# Patient Record
Sex: FEMALE | Race: BLACK OR AFRICAN AMERICAN | NOT HISPANIC OR LATINO | URBAN - METROPOLITAN AREA
[De-identification: names, ages, dates, MRNs, and addresses within clinical notes are randomized per-mention and may not be internally consistent; named-entity substitution may affect disease eponyms.]

---

## 2020-07-25 ENCOUNTER — TELEPHONE ENCOUNTER (OUTPATIENT)
Dept: URBAN - METROPOLITAN AREA CLINIC 13 | Facility: CLINIC | Age: 80
End: 2020-07-25

## 2020-07-25 RX ORDER — POLYETHYLENE GLYCOL 3350, SODIUM CHLORIDE, SODIUM BICARBONATE AND POTASSIUM CHLORIDE WITH LEMON FLAVOR 420; 11.2; 5.72; 1.48 G/4L; G/4L; G/4L; G/4L
TAKE  ML AS DIRECTED MIX CONTENTS PER DIRECTIONS, BEGIN DRINKING 1/2 SOLUTION @ 5P, COMPLETE REMAINDER OF SOLUTION 6HR PRIOR TO PROCEDURE POWDER, FOR SOLUTION ORAL
Qty: 1 | Refills: 0 | OUTPATIENT
Start: 2017-10-23 | End: 2017-11-13

## 2020-07-25 RX ORDER — ESOMEPRAZOLE MAGNESIUM 40 MG
TAKE 1 CAPSULE DAILY AS NEEDED CAPSULE,DELAYED RELEASE (ENTERIC COATED) ORAL
Refills: 0 | OUTPATIENT
Start: 2011-12-31 | End: 2014-11-18

## 2020-07-25 RX ORDER — ROSUVASTATIN CALCIUM 20 MG
TAKE 1 TABLET DAILY TABLET ORAL
Refills: 0 | OUTPATIENT
Start: 2011-11-10 | End: 2012-06-12

## 2020-07-25 RX ORDER — PRAVASTATIN SODIUM 80 MG/1
TAKE 1 TABLET DAILY TABLET ORAL
Refills: 0 | OUTPATIENT
End: 2017-11-13

## 2020-07-25 RX ORDER — POLYETHYLENE GLYCOL 3350, SODIUM CHLORIDE, SODIUM BICARBONATE AND POTASSIUM CHLORIDE WITH LEMON FLAVOR 420; 11.2; 5.72; 1.48 G/4L; G/4L; G/4L; G/4L
TAKE AS DIRECTED POWDER, FOR SOLUTION ORAL
Qty: 1 | Refills: 0 | OUTPATIENT
Start: 2012-05-21 | End: 2012-06-12

## 2020-07-26 ENCOUNTER — TELEPHONE ENCOUNTER (OUTPATIENT)
Dept: URBAN - METROPOLITAN AREA CLINIC 13 | Facility: CLINIC | Age: 80
End: 2020-07-26

## 2020-07-26 RX ORDER — IBUPROFEN 600 MG/1
TABLET ORAL
Qty: 24 | Refills: 0 | Status: ACTIVE | COMMUNITY
Start: 2011-07-22

## 2020-07-26 RX ORDER — ALLOPURINOL 300 MG/1
TAKE 1 TABLET DAILY AS DIRECTED TABLET ORAL
Qty: 30 | Refills: 0 | Status: ACTIVE | COMMUNITY
Start: 2012-05-07

## 2020-07-26 RX ORDER — METHYLPREDNISOLONE 4 MG/1
TABLET ORAL
Qty: 21 | Refills: 0 | Status: ACTIVE | COMMUNITY
Start: 2020-01-24

## 2020-07-26 RX ORDER — SUCRALFATE 1 G/1
TAKE 1 TABLET 4 TIMES DAILY, BEFORE MEALS AND AT BEDTIME TABLET ORAL
Qty: 120 | Refills: 2 | Status: ACTIVE | COMMUNITY
Start: 2012-01-18

## 2020-07-26 RX ORDER — PRAVASTATIN SODIUM 80 MG/1
TAKE 1 TABLET 3 TIMES A WEEK. UNKNOWN DOSAGE PER PATIENT TABLET ORAL
Refills: 0 | Status: ACTIVE | COMMUNITY

## 2020-07-26 RX ORDER — PRAVASTATIN SODIUM 40 MG/1
TABLET ORAL
Qty: 90 | Refills: 0 | Status: ACTIVE | COMMUNITY
Start: 2013-12-13

## 2020-07-26 RX ORDER — NITROFURANTOIN 100 MG/1
CAPSULE ORAL
Qty: 10 | Refills: 0 | Status: ACTIVE | COMMUNITY
Start: 2011-07-22

## 2020-07-26 RX ORDER — DICLOFENAC SODIUM 1 %
GEL (GRAM) TOPICAL
Qty: 100 | Refills: 0 | Status: ACTIVE | COMMUNITY
Start: 2014-02-09

## 2020-07-26 RX ORDER — OXYCODONE AND ACETAMINOPHEN 5; 325 MG/1; MG/1
TABLET ORAL
Qty: 60 | Refills: 0 | Status: ACTIVE | COMMUNITY
Start: 2012-01-02

## 2020-07-26 RX ORDER — ROSUVASTATIN CALCIUM 20 MG
TABLET ORAL
Qty: 30 | Refills: 0 | Status: ACTIVE | COMMUNITY
Start: 2011-11-10

## 2020-07-26 RX ORDER — LUBIPROSTONE 8 UG/1
CAPSULE, GELATIN COATED ORAL
Qty: 30 | Refills: 0 | Status: ACTIVE | COMMUNITY
Start: 2011-03-04

## 2020-07-26 RX ORDER — PREDNISONE 10 MG/1
TABLET ORAL
Qty: 48 | Refills: 0 | Status: ACTIVE | COMMUNITY
Start: 2019-01-18

## 2020-07-26 RX ORDER — DEXLANSOPRAZOLE 60 MG/1
TAKE 1 CAPSULE DAILY EVERY MORNING BEFORE BREAKFAST CAPSULE, DELAYED RELEASE ORAL
Qty: 90 | Refills: 3 | Status: ACTIVE | COMMUNITY

## 2020-07-26 RX ORDER — IRBESARTAN/HYDROCHLOROTHIAZIDE 150-12.5MG
TAKE 1 TABLET ONCE DAILY TABLET ORAL
Refills: 0 | Status: ACTIVE | COMMUNITY
Start: 2012-02-20

## 2020-07-26 RX ORDER — DEXLANSOPRAZOLE 60 MG/1
CAPSULE, DELAYED RELEASE ORAL
Qty: 90 | Refills: 0 | Status: ACTIVE | COMMUNITY
Start: 2012-02-02

## 2020-07-26 RX ORDER — COLCHICINE 0.6 MG/1
TAKE 1 TABLET BY MOUTH TWICE A DAY TABLET, FILM COATED ORAL
Qty: 7 | Refills: 0 | Status: ACTIVE | COMMUNITY
Start: 2012-04-20

## 2020-07-26 RX ORDER — PREDNISONE 10 MG/1
TABLET ORAL
Qty: 10 | Refills: 0 | Status: ACTIVE | COMMUNITY
Start: 2019-09-20

## 2020-07-26 RX ORDER — SUCRALFATE 1 G/1
TABLET ORAL
Qty: 90 | Refills: 0 | Status: ACTIVE | COMMUNITY
Start: 2011-12-08

## 2020-07-26 RX ORDER — DEXLANSOPRAZOLE 60 MG/1
CAPSULE, DELAYED RELEASE ORAL
Qty: 30 | Refills: 0 | Status: ACTIVE | COMMUNITY
Start: 2011-11-30

## 2020-07-26 RX ORDER — SIMVASTATIN 20 MG/1
TABLET, FILM COATED ORAL
Qty: 90 | Refills: 0 | Status: ACTIVE | COMMUNITY
Start: 2011-08-12

## 2020-07-26 RX ORDER — ESOMEPRAZOLE MAGNESIUM 40 MG
CAPSULE,DELAYED RELEASE (ENTERIC COATED) ORAL
Qty: 30 | Refills: 0 | Status: ACTIVE | COMMUNITY
Start: 2011-12-31

## 2020-07-26 RX ORDER — TRAMADOL HYDROCHLORIDE 50 MG/1
TABLET ORAL
Qty: 30 | Refills: 0 | Status: ACTIVE | COMMUNITY
Start: 2019-03-11

## 2020-07-26 RX ORDER — TRAMADOL HYDROCHLORIDE 50 MG/1
TABLET ORAL
Qty: 30 | Refills: 0 | Status: ACTIVE | COMMUNITY
Start: 2014-02-18

## 2020-07-26 RX ORDER — HYDROCODONE BITARTRATE AND ACETAMINOPHEN 5; 325 MG/1; MG/1
TABLET ORAL
Qty: 90 | Refills: 0 | Status: ACTIVE | COMMUNITY
Start: 2011-11-30

## 2020-07-26 RX ORDER — LATANOPROST/PF 0.005 %
DROPS OPHTHALMIC (EYE)
Qty: 2 | Refills: 0 | Status: ACTIVE | COMMUNITY
Start: 2011-08-27

## 2020-07-26 RX ORDER — DICLOFENAC SODIUM 10 MG/G
GEL TOPICAL
Qty: 300 | Refills: 0 | Status: ACTIVE | COMMUNITY
Start: 2019-05-21

## 2020-07-26 RX ORDER — DICLOFENAC SODIUM 10 MG/G
APPLY SPARINGLY TO AFFECTED AREA(S) ONCE DAILY GEL TOPICAL
Refills: 0 | Status: ACTIVE | COMMUNITY
Start: 2020-02-23

## 2020-07-26 RX ORDER — IRBESARTAN/HYDROCHLOROTHIAZIDE 150-12.5MG
TABLET ORAL
Qty: 90 | Refills: 0 | Status: ACTIVE | COMMUNITY
Start: 2011-11-10

## 2020-07-26 RX ORDER — METHYLPREDNISOLONE 4 MG/1
TABLET ORAL
Qty: 30 | Refills: 0 | Status: ACTIVE | COMMUNITY
Start: 2019-03-11

## 2020-12-17 ENCOUNTER — WEB ENCOUNTER (OUTPATIENT)
Dept: URBAN - METROPOLITAN AREA CLINIC 113 | Facility: CLINIC | Age: 80
End: 2020-12-17

## 2020-12-17 ENCOUNTER — LAB OUTSIDE AN ENCOUNTER (OUTPATIENT)
Dept: URBAN - METROPOLITAN AREA CLINIC 113 | Facility: CLINIC | Age: 80
End: 2020-12-17

## 2020-12-17 ENCOUNTER — OFFICE VISIT (OUTPATIENT)
Dept: URBAN - METROPOLITAN AREA CLINIC 113 | Facility: CLINIC | Age: 80
End: 2020-12-17
Payer: MEDICARE

## 2020-12-17 VITALS
HEIGHT: 63 IN | TEMPERATURE: 97.8 F | DIASTOLIC BLOOD PRESSURE: 70 MMHG | WEIGHT: 155 LBS | HEART RATE: 80 BPM | BODY MASS INDEX: 27.46 KG/M2 | SYSTOLIC BLOOD PRESSURE: 124 MMHG

## 2020-12-17 DIAGNOSIS — K21.9 GERD WITHOUT ESOPHAGITIS: ICD-10-CM

## 2020-12-17 DIAGNOSIS — R63.4 WEIGHT LOSS: ICD-10-CM

## 2020-12-17 PROCEDURE — G8427 DOCREV CUR MEDS BY ELIG CLIN: HCPCS | Performed by: INTERNAL MEDICINE

## 2020-12-17 PROCEDURE — G8482 FLU IMMUNIZE ORDER/ADMIN: HCPCS | Performed by: INTERNAL MEDICINE

## 2020-12-17 PROCEDURE — G9903 PT SCRN TBCO ID AS NON USER: HCPCS | Performed by: INTERNAL MEDICINE

## 2020-12-17 PROCEDURE — 99213 OFFICE O/P EST LOW 20 MIN: CPT | Performed by: INTERNAL MEDICINE

## 2020-12-17 RX ORDER — IBUPROFEN 600 MG/1
TABLET ORAL
Qty: 24 | Refills: 0 | Status: ON HOLD | COMMUNITY
Start: 2011-07-22

## 2020-12-17 RX ORDER — PRAVASTATIN SODIUM 40 MG/1
TABLET ORAL
Qty: 90 | Refills: 0 | Status: ON HOLD | COMMUNITY
Start: 2013-12-13

## 2020-12-17 RX ORDER — COLCHICINE 0.6 MG/1
TAKE 1 TABLET BY MOUTH TWICE A DAY TABLET, FILM COATED ORAL
Qty: 7 | Refills: 0 | Status: ON HOLD | COMMUNITY
Start: 2012-04-20

## 2020-12-17 RX ORDER — OXYCODONE AND ACETAMINOPHEN 5; 325 MG/1; MG/1
TABLET ORAL
Qty: 60 | Refills: 0 | Status: ON HOLD | COMMUNITY
Start: 2012-01-02

## 2020-12-17 RX ORDER — PREDNISONE 10 MG/1
TABLET ORAL
Qty: 48 | Refills: 0 | Status: ON HOLD | COMMUNITY
Start: 2019-01-18

## 2020-12-17 RX ORDER — HYDROCODONE BITARTRATE AND ACETAMINOPHEN 5; 325 MG/1; MG/1
TABLET ORAL
Qty: 90 | Refills: 0 | Status: ON HOLD | COMMUNITY
Start: 2011-11-30

## 2020-12-17 RX ORDER — ESOMEPRAZOLE MAGNESIUM 40 MG
CAPSULE,DELAYED RELEASE (ENTERIC COATED) ORAL
Qty: 30 | Refills: 0 | Status: ON HOLD | COMMUNITY
Start: 2011-12-31

## 2020-12-17 RX ORDER — METHYLPREDNISOLONE 4 MG/1
TABLET ORAL
Qty: 21 | Refills: 0 | Status: ON HOLD | COMMUNITY
Start: 2020-01-24

## 2020-12-17 RX ORDER — SUCRALFATE 1 G/1
TABLET ORAL
Qty: 90 | Refills: 0 | Status: ON HOLD | COMMUNITY
Start: 2011-12-08

## 2020-12-17 RX ORDER — SIMVASTATIN 20 MG/1
TABLET, FILM COATED ORAL
Qty: 90 | Refills: 0 | Status: ON HOLD | COMMUNITY
Start: 2011-08-12

## 2020-12-17 RX ORDER — ALLOPURINOL 300 MG/1
TAKE 1 TABLET DAILY AS DIRECTED TABLET ORAL
Qty: 30 | Refills: 0 | Status: ACTIVE | COMMUNITY
Start: 2012-05-07

## 2020-12-17 RX ORDER — ASPIRIN 81 MG/1
1 TABLET TABLET ORAL ONCE A DAY
Status: ACTIVE | COMMUNITY

## 2020-12-17 RX ORDER — DICLOFENAC SODIUM 10 MG/G
GEL TOPICAL
Qty: 300 | Refills: 0 | Status: ON HOLD | COMMUNITY
Start: 2019-05-21

## 2020-12-17 RX ORDER — METHYLPREDNISOLONE 4 MG/1
TABLET ORAL
Qty: 30 | Refills: 0 | Status: ON HOLD | COMMUNITY
Start: 2019-03-11

## 2020-12-17 RX ORDER — DICLOFENAC SODIUM 1 %
GEL (GRAM) TOPICAL
Qty: 100 | Refills: 0 | Status: ON HOLD | COMMUNITY
Start: 2014-02-09

## 2020-12-17 RX ORDER — PRAVASTATIN SODIUM 80 MG/1
TAKE 1 TABLET 3 TIMES A WEEK. UNKNOWN DOSAGE PER PATIENT TABLET ORAL
Refills: 0 | Status: ON HOLD | COMMUNITY

## 2020-12-17 RX ORDER — LUBIPROSTONE 8 UG/1
CAPSULE, GELATIN COATED ORAL
Qty: 30 | Refills: 0 | Status: ON HOLD | COMMUNITY
Start: 2011-03-04

## 2020-12-17 RX ORDER — ROSUVASTATIN CALCIUM 20 MG
TABLET ORAL
Qty: 30 | Refills: 0 | Status: ON HOLD | COMMUNITY
Start: 2011-11-10

## 2020-12-17 RX ORDER — PREDNISONE 10 MG/1
TABLET ORAL
Qty: 10 | Refills: 0 | Status: ON HOLD | COMMUNITY
Start: 2019-09-20

## 2020-12-17 RX ORDER — AZELASTINE HYDROCHLORIDE 0.5 MG/ML
1 DROP INTO AFFECTED EYE SOLUTION/ DROPS INTRAOCULAR ONCE A DAY
Status: ACTIVE | COMMUNITY

## 2020-12-17 RX ORDER — NITROFURANTOIN 100 MG/1
CAPSULE ORAL
Qty: 10 | Refills: 0 | Status: ON HOLD | COMMUNITY
Start: 2011-07-22

## 2020-12-17 RX ORDER — LATANOPROST/PF 0.005 %
DROPS OPHTHALMIC (EYE)
Qty: 2 | Refills: 0 | Status: ON HOLD | COMMUNITY
Start: 2011-08-27

## 2020-12-17 RX ORDER — IRBESARTAN/HYDROCHLOROTHIAZIDE 150-12.5MG
TABLET ORAL
Qty: 90 | Refills: 0 | Status: ON HOLD | COMMUNITY
Start: 2011-11-10

## 2020-12-17 RX ORDER — TRAMADOL HYDROCHLORIDE 50 MG/1
TABLET ORAL
Qty: 30 | Refills: 0 | Status: ON HOLD | COMMUNITY
Start: 2014-02-18

## 2020-12-17 RX ORDER — DEXLANSOPRAZOLE 60 MG/1
TAKE 1 CAPSULE DAILY EVERY MORNING BEFORE BREAKFAST CAPSULE, DELAYED RELEASE ORAL
Qty: 90 | Refills: 3 | Status: ACTIVE | COMMUNITY

## 2020-12-17 NOTE — HPI-TODAY'S VISIT:
80-year-old with a history of gastroesophageal reflux disease, functional dyspepsia, chronic constipation.  She is being referred by Dr. Alford for weight loss.  Blood work on September 25, 2020 revealed a sodium 139 potassium 4.2 and creatinine of 3.1.  Her AST was 10, ALT 8 total bili 0.4 and alkaline phosphatase of 39.  Total protein was 6.4 and albumin was 3.4 she had a renal ultrasound on 9/10/2020 that revealed small bilateral echogenic kidneys.  There is a nonobstructing right renal stone 5 mm in a simple cyst in the left kidney 1 cm in size she had a CT scan of the abdomen pelvis without contrast on June 5, 2020 that revealed bibasilar atelectasis prior cholecystectomy a 7 mm left renal cyst and a right kidney calculus the pancreas was normal there is a tiny fat filled umbilical hernia containing nonobstructed loop of large bowel without obstruction.  CBC on 9/2/2020 revealed a WBC of 7.7, hemoglobin 10.8 and platelet count of 247,000 She had a colonoscopy on November 13, 2017 that revealed a few medium diverticuli of the left colon, scattered debris but no colon polyps.  Her last upper endoscopy was on July 9, 2012 that revealed a normal esophagus with small hiatal hernia there was some gastritis biopsies revealed no H. pylori duodenum was normal She has had really no appetite for the last couple of months.  She has lost a significant amount of weight.  He does not feel like eating.  There is no fevers or chills she denies any heartburn or dysphagia.  She occasionally has some lower abdominal cramping.  She does use some Pepto-Bismol for that.  Her stools have been dark on the Pepto-Bismol.  There is no bright red blood.  She has 3-4 bowel moods per day usually little balls of stool.  She does still take the MiraLAX.  She denies any significant abdominal pain.  She denies any nausea or vomiting.

## 2021-01-06 ENCOUNTER — OFFICE VISIT (OUTPATIENT)
Dept: URBAN - METROPOLITAN AREA SURGERY CENTER 25 | Facility: SURGERY CENTER | Age: 81
End: 2021-01-06

## 2021-02-09 ENCOUNTER — TELEPHONE ENCOUNTER (OUTPATIENT)
Dept: URBAN - METROPOLITAN AREA CLINIC 113 | Facility: CLINIC | Age: 81
End: 2021-02-09

## 2021-02-12 ENCOUNTER — OFFICE VISIT (OUTPATIENT)
Dept: URBAN - METROPOLITAN AREA CLINIC 113 | Facility: CLINIC | Age: 81
End: 2021-02-12

## 2021-03-05 ENCOUNTER — CLAIMS CREATED FROM THE CLAIM WINDOW (OUTPATIENT)
Dept: URBAN - METROPOLITAN AREA CLINIC 4 | Facility: CLINIC | Age: 81
End: 2021-03-05
Payer: MEDICARE

## 2021-03-05 ENCOUNTER — OFFICE VISIT (OUTPATIENT)
Dept: URBAN - METROPOLITAN AREA SURGERY CENTER 25 | Facility: SURGERY CENTER | Age: 81
End: 2021-03-05
Payer: MEDICARE

## 2021-03-05 DIAGNOSIS — K21.9 ACID REFLUX: ICD-10-CM

## 2021-03-05 DIAGNOSIS — K29.60 OTHER GASTRITIS WITHOUT BLEEDING: ICD-10-CM

## 2021-03-05 DIAGNOSIS — K31.89 DILATION OF STOMACH: ICD-10-CM

## 2021-03-05 DIAGNOSIS — K31.89 ACQUIRED DEFORMITY OF DUODENUM: ICD-10-CM

## 2021-03-05 PROCEDURE — G8907 PT DOC NO EVENTS ON DISCHARG: HCPCS | Performed by: INTERNAL MEDICINE

## 2021-03-05 PROCEDURE — 88312 SPECIAL STAINS GROUP 1: CPT | Performed by: PATHOLOGY

## 2021-03-05 PROCEDURE — 43239 EGD BIOPSY SINGLE/MULTIPLE: CPT | Performed by: INTERNAL MEDICINE

## 2021-03-05 PROCEDURE — 88305 TISSUE EXAM BY PATHOLOGIST: CPT | Performed by: PATHOLOGY

## 2021-03-05 RX ORDER — ESOMEPRAZOLE MAGNESIUM 40 MG
CAPSULE,DELAYED RELEASE (ENTERIC COATED) ORAL
Qty: 30 | Refills: 0 | Status: ON HOLD | COMMUNITY
Start: 2011-12-31

## 2021-03-05 RX ORDER — DICLOFENAC SODIUM 1 %
GEL (GRAM) TOPICAL
Qty: 100 | Refills: 0 | Status: ON HOLD | COMMUNITY
Start: 2014-02-09

## 2021-03-05 RX ORDER — ALLOPURINOL 300 MG/1
TAKE 1 TABLET DAILY AS DIRECTED TABLET ORAL
Qty: 30 | Refills: 0 | Status: ACTIVE | COMMUNITY
Start: 2012-05-07

## 2021-03-05 RX ORDER — PRAVASTATIN SODIUM 40 MG/1
TABLET ORAL
Qty: 90 | Refills: 0 | Status: ON HOLD | COMMUNITY
Start: 2013-12-13

## 2021-03-05 RX ORDER — LATANOPROST/PF 0.005 %
DROPS OPHTHALMIC (EYE)
Qty: 2 | Refills: 0 | Status: ON HOLD | COMMUNITY
Start: 2011-08-27

## 2021-03-05 RX ORDER — SUCRALFATE 1 G/1
TABLET ORAL
Qty: 90 | Refills: 0 | Status: ON HOLD | COMMUNITY
Start: 2011-12-08

## 2021-03-05 RX ORDER — SIMVASTATIN 20 MG/1
TABLET, FILM COATED ORAL
Qty: 90 | Refills: 0 | Status: ON HOLD | COMMUNITY
Start: 2011-08-12

## 2021-03-05 RX ORDER — AZELASTINE HYDROCHLORIDE 0.5 MG/ML
1 DROP INTO AFFECTED EYE SOLUTION/ DROPS INTRAOCULAR ONCE A DAY
Status: ACTIVE | COMMUNITY

## 2021-03-05 RX ORDER — HYDROCODONE BITARTRATE AND ACETAMINOPHEN 5; 325 MG/1; MG/1
TABLET ORAL
Qty: 90 | Refills: 0 | Status: ON HOLD | COMMUNITY
Start: 2011-11-30

## 2021-03-05 RX ORDER — COLCHICINE 0.6 MG/1
TAKE 1 TABLET BY MOUTH TWICE A DAY TABLET, FILM COATED ORAL
Qty: 7 | Refills: 0 | Status: ON HOLD | COMMUNITY
Start: 2012-04-20

## 2021-03-05 RX ORDER — METHYLPREDNISOLONE 4 MG/1
TABLET ORAL
Qty: 30 | Refills: 0 | Status: ON HOLD | COMMUNITY
Start: 2019-03-11

## 2021-03-05 RX ORDER — TRAMADOL HYDROCHLORIDE 50 MG/1
TABLET ORAL
Qty: 30 | Refills: 0 | Status: ON HOLD | COMMUNITY
Start: 2014-02-18

## 2021-03-05 RX ORDER — METHYLPREDNISOLONE 4 MG/1
TABLET ORAL
Qty: 21 | Refills: 0 | Status: ON HOLD | COMMUNITY
Start: 2020-01-24

## 2021-03-05 RX ORDER — ROSUVASTATIN CALCIUM 20 MG
TABLET ORAL
Qty: 30 | Refills: 0 | Status: ON HOLD | COMMUNITY
Start: 2011-11-10

## 2021-03-05 RX ORDER — ASPIRIN 81 MG/1
1 TABLET TABLET ORAL ONCE A DAY
Status: ACTIVE | COMMUNITY

## 2021-03-05 RX ORDER — PREDNISONE 10 MG/1
TABLET ORAL
Qty: 10 | Refills: 0 | Status: ON HOLD | COMMUNITY
Start: 2019-09-20

## 2021-03-05 RX ORDER — LUBIPROSTONE 8 UG/1
CAPSULE, GELATIN COATED ORAL
Qty: 30 | Refills: 0 | Status: ON HOLD | COMMUNITY
Start: 2011-03-04

## 2021-03-05 RX ORDER — PRAVASTATIN SODIUM 80 MG/1
TAKE 1 TABLET 3 TIMES A WEEK. UNKNOWN DOSAGE PER PATIENT TABLET ORAL
Refills: 0 | Status: ON HOLD | COMMUNITY

## 2021-03-05 RX ORDER — DICLOFENAC SODIUM 10 MG/G
GEL TOPICAL
Qty: 300 | Refills: 0 | Status: ON HOLD | COMMUNITY
Start: 2019-05-21

## 2021-03-05 RX ORDER — DEXLANSOPRAZOLE 60 MG/1
TAKE 1 CAPSULE DAILY EVERY MORNING BEFORE BREAKFAST CAPSULE, DELAYED RELEASE ORAL
Qty: 90 | Refills: 3 | Status: ACTIVE | COMMUNITY

## 2021-03-05 RX ORDER — NITROFURANTOIN 100 MG/1
CAPSULE ORAL
Qty: 10 | Refills: 0 | Status: ON HOLD | COMMUNITY
Start: 2011-07-22

## 2021-03-05 RX ORDER — IBUPROFEN 600 MG/1
TABLET ORAL
Qty: 24 | Refills: 0 | Status: ON HOLD | COMMUNITY
Start: 2011-07-22

## 2021-03-05 RX ORDER — PREDNISONE 10 MG/1
TABLET ORAL
Qty: 48 | Refills: 0 | Status: ON HOLD | COMMUNITY
Start: 2019-01-18

## 2021-03-05 RX ORDER — IRBESARTAN/HYDROCHLOROTHIAZIDE 150-12.5MG
TABLET ORAL
Qty: 90 | Refills: 0 | Status: ON HOLD | COMMUNITY
Start: 2011-11-10

## 2021-03-05 RX ORDER — OXYCODONE AND ACETAMINOPHEN 5; 325 MG/1; MG/1
TABLET ORAL
Qty: 60 | Refills: 0 | Status: ON HOLD | COMMUNITY
Start: 2012-01-02

## 2021-04-01 ENCOUNTER — OFFICE VISIT (OUTPATIENT)
Dept: URBAN - METROPOLITAN AREA CLINIC 113 | Facility: CLINIC | Age: 81
End: 2021-04-01

## 2021-04-01 NOTE — HPI-TODAY'S VISIT:
80-year-old with a history of gastroesophageal reflux disease, functional dyspepsia, chronic constipation.  She is being referred by Dr. Alford for weight loss.  Blood work on September 25, 2020 revealed a sodium 139 potassium 4.2 and creatinine of 3.1.  Her AST was 10, ALT 8 total bili 0.4 and alkaline phosphatase of 39.  Total protein was 6.4 and albumin was 3.4 she had a renal ultrasound on 9/10/2020 that revealed small bilateral echogenic kidneys.  There is a nonobstructing right renal stone 5 mm in a simple cyst in the left kidney 1 cm in size she had a CT scan of the abdomen pelvis without contrast on June 5, 2020 that revealed bibasilar atelectasis prior cholecystectomy a 7 mm left renal cyst and a right kidney calculus the pancreas was normal there is a tiny fat filled umbilical hernia containing nonobstructed loop of large bowel without obstruction.  CBC on 9/2/2020 revealed a WBC of 7.7, hemoglobin 10.8 and platelet count of 247,000 She had a colonoscopy on November 13, 2017 that revealed a few medium diverticuli of the left colon, scattered debris but no colon polyps.  Her last upper endoscopy was on July 9, 2012 that revealed a normal esophagus with small hiatal hernia there was some gastritis biopsies revealed no H. pylori duodenum was normal She has had really no appetite for the last couple of months.  She has lost a significant amount of weight.  He does not feel like eating.  There is no fevers or chills she denies any heartburn or dysphagia.  She occasionally has some lower abdominal cramping.  She does use some Pepto-Bismol for that.  Her stools have been dark on the Pepto-Bismol.  There is no bright red blood.  She has 3-4 bowel moods per day usually little balls of stool.  She does still take the MiraLAX.  She denies any significant abdominal pain.  She denies any nausea or vomiting. 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

## 2021-07-20 ENCOUNTER — OFFICE VISIT (OUTPATIENT)
Dept: URBAN - METROPOLITAN AREA CLINIC 113 | Facility: CLINIC | Age: 81
End: 2021-07-20
Payer: MEDICARE

## 2021-07-20 VITALS
HEIGHT: 63 IN | TEMPERATURE: 97.7 F | DIASTOLIC BLOOD PRESSURE: 69 MMHG | SYSTOLIC BLOOD PRESSURE: 134 MMHG | BODY MASS INDEX: 27.29 KG/M2 | HEART RATE: 67 BPM | WEIGHT: 154 LBS

## 2021-07-20 DIAGNOSIS — K59.04 CHRONIC IDIOPATHIC CONSTIPATION: ICD-10-CM

## 2021-07-20 DIAGNOSIS — K21.9 GERD WITHOUT ESOPHAGITIS: ICD-10-CM

## 2021-07-20 DIAGNOSIS — R63.4 WEIGHT LOSS: ICD-10-CM

## 2021-07-20 PROCEDURE — 99213 OFFICE O/P EST LOW 20 MIN: CPT | Performed by: INTERNAL MEDICINE

## 2021-07-20 RX ORDER — DICLOFENAC SODIUM 10 MG/G
GEL TOPICAL
Qty: 300 | Refills: 0 | Status: ON HOLD | COMMUNITY
Start: 2019-05-21

## 2021-07-20 RX ORDER — DICLOFENAC SODIUM 1 %
GEL (GRAM) TOPICAL
Qty: 100 | Refills: 0 | Status: ON HOLD | COMMUNITY
Start: 2014-02-09

## 2021-07-20 RX ORDER — PREDNISONE 10 MG/1
TABLET ORAL
Qty: 10 | Refills: 0 | Status: ON HOLD | COMMUNITY
Start: 2019-09-20

## 2021-07-20 RX ORDER — DEXLANSOPRAZOLE 60 MG/1
TAKE 1 CAPSULE DAILY EVERY MORNING BEFORE BREAKFAST CAPSULE, DELAYED RELEASE ORAL
Qty: 90 | Refills: 3 | Status: ACTIVE | COMMUNITY

## 2021-07-20 RX ORDER — ROSUVASTATIN CALCIUM 20 MG
TABLET ORAL
Qty: 30 | Refills: 0 | Status: ON HOLD | COMMUNITY
Start: 2011-11-10

## 2021-07-20 RX ORDER — SIMVASTATIN 20 MG/1
TABLET, FILM COATED ORAL
Qty: 90 | Refills: 0 | Status: ON HOLD | COMMUNITY
Start: 2011-08-12

## 2021-07-20 RX ORDER — LATANOPROST/PF 0.005 %
DROPS OPHTHALMIC (EYE)
Qty: 2 | Refills: 0 | Status: ON HOLD | COMMUNITY
Start: 2011-08-27

## 2021-07-20 RX ORDER — COLCHICINE 0.6 MG/1
TAKE 1 TABLET BY MOUTH TWICE A DAY TABLET, FILM COATED ORAL
Qty: 7 | Refills: 0 | Status: ON HOLD | COMMUNITY
Start: 2012-04-20

## 2021-07-20 RX ORDER — TRAMADOL HYDROCHLORIDE 50 MG/1
TABLET ORAL
Qty: 30 | Refills: 0 | Status: ON HOLD | COMMUNITY
Start: 2014-02-18

## 2021-07-20 RX ORDER — METHYLPREDNISOLONE 4 MG/1
TABLET ORAL
Qty: 30 | Refills: 0 | Status: ON HOLD | COMMUNITY
Start: 2019-03-11

## 2021-07-20 RX ORDER — IBUPROFEN 600 MG/1
TABLET ORAL
Qty: 24 | Refills: 0 | Status: ON HOLD | COMMUNITY
Start: 2011-07-22

## 2021-07-20 RX ORDER — ALLOPURINOL 300 MG/1
TAKE 1 TABLET DAILY AS DIRECTED TABLET ORAL
Qty: 30 | Refills: 0 | Status: ACTIVE | COMMUNITY
Start: 2012-05-07

## 2021-07-20 RX ORDER — ESOMEPRAZOLE MAGNESIUM 40 MG
CAPSULE,DELAYED RELEASE (ENTERIC COATED) ORAL
Qty: 30 | Refills: 0 | Status: ON HOLD | COMMUNITY
Start: 2011-12-31

## 2021-07-20 RX ORDER — PRAVASTATIN SODIUM 40 MG/1
TABLET ORAL
Qty: 90 | Refills: 0 | Status: ON HOLD | COMMUNITY
Start: 2013-12-13

## 2021-07-20 RX ORDER — METHYLPREDNISOLONE 4 MG/1
TABLET ORAL
Qty: 21 | Refills: 0 | Status: ON HOLD | COMMUNITY
Start: 2020-01-24

## 2021-07-20 RX ORDER — SUCRALFATE 1 G/1
TABLET ORAL
Qty: 90 | Refills: 0 | Status: ON HOLD | COMMUNITY
Start: 2011-12-08

## 2021-07-20 RX ORDER — OXYCODONE AND ACETAMINOPHEN 5; 325 MG/1; MG/1
TABLET ORAL
Qty: 60 | Refills: 0 | Status: ON HOLD | COMMUNITY
Start: 2012-01-02

## 2021-07-20 RX ORDER — IRBESARTAN/HYDROCHLOROTHIAZIDE 150-12.5MG
TABLET ORAL
Qty: 90 | Refills: 0 | Status: ON HOLD | COMMUNITY
Start: 2011-11-10

## 2021-07-20 RX ORDER — SUCRALFATE 1 G
1 TABLET ON AN EMPTY STOMACH TABLET ORAL TWICE A DAY
Status: ACTIVE | COMMUNITY

## 2021-07-20 RX ORDER — PREDNISONE 10 MG/1
TABLET ORAL
Qty: 48 | Refills: 0 | Status: ON HOLD | COMMUNITY
Start: 2019-01-18

## 2021-07-20 RX ORDER — PRAVASTATIN SODIUM 80 MG/1
TAKE 1 TABLET 3 TIMES A WEEK. UNKNOWN DOSAGE PER PATIENT TABLET ORAL
Refills: 0 | Status: ON HOLD | COMMUNITY

## 2021-07-20 RX ORDER — AZELASTINE HYDROCHLORIDE 0.5 MG/ML
1 DROP INTO AFFECTED EYE SOLUTION/ DROPS INTRAOCULAR ONCE A DAY
Status: ACTIVE | COMMUNITY

## 2021-07-20 RX ORDER — NITROFURANTOIN 100 MG/1
CAPSULE ORAL
Qty: 10 | Refills: 0 | Status: ON HOLD | COMMUNITY
Start: 2011-07-22

## 2021-07-20 RX ORDER — HYDROCODONE BITARTRATE AND ACETAMINOPHEN 5; 325 MG/1; MG/1
TABLET ORAL
Qty: 90 | Refills: 0 | Status: ON HOLD | COMMUNITY
Start: 2011-11-30

## 2021-07-20 RX ORDER — LUBIPROSTONE 8 UG/1
CAPSULE, GELATIN COATED ORAL
Qty: 30 | Refills: 0 | Status: ON HOLD | COMMUNITY
Start: 2011-03-04

## 2021-07-20 RX ORDER — ASPIRIN 81 MG/1
1 TABLET TABLET ORAL ONCE A DAY
Status: ACTIVE | COMMUNITY

## 2021-07-20 NOTE — HPI-TODAY'S VISIT:
81-year-old with a history of gastroesophageal reflux disease, functional dyspepsia, chronic constipation.  She is being referred by Dr. Alford for weight loss.  Blood work on September 25, 2020 revealed a sodium 139 potassium 4.2 and creatinine of 3.1.  Her AST was 10, ALT 8 total bili 0.4 and alkaline phosphatase of 39.  Total protein was 6.4 and albumin was 3.4 she had a renal ultrasound on 9/10/2020 that revealed small bilateral echogenic kidneys.  There is a nonobstructing right renal stone 5 mm in a simple cyst in the left kidney 1 cm in size she had a CT scan of the abdomen pelvis without contrast on June 5, 2020 that revealed bibasilar atelectasis prior cholecystectomy a 7 mm left renal cyst and a right kidney calculus the pancreas was normal there is a tiny fat filled umbilical hernia containing nonobstructed loop of large bowel without obstruction.  CBC on 9/2/2020 revealed a WBC of 7.7, hemoglobin 10.8 and platelet count of 247,000 She had a colonoscopy on November 13, 2017 that revealed a few medium diverticuli of the left colon, scattered debris but no colon polyps.  Her last upper endoscopy was on July 9, 2012 that revealed a normal esophagus with small hiatal hernia there was some gastritis biopsies revealed no H. 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 She is doing a lot better.  Her appetite is much better and she is eating quite a bit more.  She tends to be a little bit more constipated despite taking the MiraLAX every day.  She's had no dysphagia, heartburn, abdominal pain, nausea or vomiting.  She moves her bowels about every other day.  Her weight has been stable since her last visit.

## 2021-10-08 ENCOUNTER — OFFICE VISIT (OUTPATIENT)
Dept: URBAN - METROPOLITAN AREA CLINIC 113 | Facility: CLINIC | Age: 81
End: 2021-10-08

## 2021-10-13 ENCOUNTER — OFFICE VISIT (OUTPATIENT)
Dept: URBAN - METROPOLITAN AREA CLINIC 113 | Facility: CLINIC | Age: 81
End: 2021-10-13
Payer: MEDICARE

## 2021-10-13 VITALS
WEIGHT: 150 LBS | BODY MASS INDEX: 26.58 KG/M2 | DIASTOLIC BLOOD PRESSURE: 73 MMHG | TEMPERATURE: 97.7 F | SYSTOLIC BLOOD PRESSURE: 127 MMHG | HEIGHT: 63 IN | HEART RATE: 65 BPM

## 2021-10-13 DIAGNOSIS — R63.4 WEIGHT LOSS: ICD-10-CM

## 2021-10-13 DIAGNOSIS — K59.04 CHRONIC IDIOPATHIC CONSTIPATION: ICD-10-CM

## 2021-10-13 DIAGNOSIS — K21.9 GERD WITHOUT ESOPHAGITIS: ICD-10-CM

## 2021-10-13 PROCEDURE — 99213 OFFICE O/P EST LOW 20 MIN: CPT | Performed by: NURSE PRACTITIONER

## 2021-10-13 RX ORDER — SUCRALFATE 1 G
1 TABLET ON AN EMPTY STOMACH TABLET ORAL TWICE A DAY
Status: ACTIVE | COMMUNITY

## 2021-10-13 RX ORDER — AZELASTINE HYDROCHLORIDE 0.5 MG/ML
1 DROP INTO AFFECTED EYE SOLUTION/ DROPS INTRAOCULAR ONCE A DAY
Status: ACTIVE | COMMUNITY

## 2021-10-13 RX ORDER — ALLOPURINOL 300 MG/1
TAKE 1 TABLET DAILY AS DIRECTED TABLET ORAL
Qty: 30 | Refills: 0 | Status: ACTIVE | COMMUNITY
Start: 2012-05-07

## 2021-10-13 RX ORDER — ASPIRIN 81 MG/1
1 TABLET TABLET ORAL ONCE A DAY
Status: ACTIVE | COMMUNITY

## 2021-10-13 RX ORDER — DEXLANSOPRAZOLE 60 MG/1
TAKE 1 CAPSULE DAILY EVERY MORNING BEFORE BREAKFAST CAPSULE, DELAYED RELEASE ORAL
Qty: 90 | Refills: 3 | Status: ACTIVE | COMMUNITY

## 2021-10-13 NOTE — HPI-TODAY'S VISIT:
81-year-old with a history of gastroesophageal reflux disease, functional dyspepsia, chronic constipation.  She is being referred by Dr. Alford for weight loss.  Blood work on September 25, 2020 revealed a sodium 139 potassium 4.2 and creatinine of 3.1.  Her AST was 10, ALT 8 total bili 0.4 and alkaline phosphatase of 39.  Total protein was 6.4 and albumin was 3.4 she had a renal ultrasound on 9/10/2020 that revealed small bilateral echogenic kidneys.  There is a nonobstructing right renal stone 5 mm in a simple cyst in the left kidney 1 cm in size she had a CT scan of the abdomen pelvis without contrast on June 5, 2020 that revealed bibasilar atelectasis prior cholecystectomy a 7 mm left renal cyst and a right kidney calculus the pancreas was normal there is a tiny fat filled umbilical hernia containing nonobstructed loop of large bowel without obstruction.  CBC on 9/2/2020 revealed a WBC of 7.7, hemoglobin 10.8 and platelet count of 247,000 She had a colonoscopy on November 13, 2017 that revealed a few medium diverticuli of the left colon, scattered debris but no colon polyps.  Her last upper endoscopy was on July 9, 2012 that revealed a normal esophagus with small hiatal hernia there was some gastritis biopsies revealed no H. 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 July 20, 2021: She is doing a lot better.  Her appetite is much better and she is eating quite a bit more.  She tends to be a little bit more constipated despite taking the MiraLAX every day.  She's had no dysphagia, heartburn, abdominal pain, nausea or vomiting.  She moves her bowels about every other day.  Her weight has been stable since her last visit.  10/31/21: She is down about 4 or 5 times since her last visit. She tells me that her weight loss was somewhat intentional, but mostly uninentional. Her appetite is poor. She tries to eat 3 meals per day, but often times she only eats 2 meals per day. There is some early satiety. There is no nausea or vomiting. There is no abdominal distention or bloating. She has bowel movements every couple of days. There is some straining. She is taking MiraLAX once daily. She started taking it twice daily as of yesterday. There is no melena. There is no heartburn or dysphagia.

## 2021-10-14 LAB
C-REACTIVE PROTEIN, QUANT: 2
SEDIMENTATION RATE-WESTERGREN: 9

## 2021-11-24 ENCOUNTER — OFFICE VISIT (OUTPATIENT)
Dept: URBAN - METROPOLITAN AREA CLINIC 113 | Facility: CLINIC | Age: 81
End: 2021-11-24

## 2021-12-28 ENCOUNTER — OFFICE VISIT (OUTPATIENT)
Dept: URBAN - METROPOLITAN AREA CLINIC 113 | Facility: CLINIC | Age: 81
End: 2021-12-28
Payer: MEDICARE

## 2021-12-28 VITALS
DIASTOLIC BLOOD PRESSURE: 68 MMHG | HEIGHT: 63 IN | SYSTOLIC BLOOD PRESSURE: 135 MMHG | RESPIRATION RATE: 20 BRPM | BODY MASS INDEX: 26.75 KG/M2 | HEART RATE: 73 BPM | WEIGHT: 151 LBS | TEMPERATURE: 97.8 F

## 2021-12-28 DIAGNOSIS — R63.4 WEIGHT LOSS: ICD-10-CM

## 2021-12-28 DIAGNOSIS — K21.9 GERD WITHOUT ESOPHAGITIS: ICD-10-CM

## 2021-12-28 DIAGNOSIS — K59.04 CHRONIC IDIOPATHIC CONSTIPATION: ICD-10-CM

## 2021-12-28 PROCEDURE — 99213 OFFICE O/P EST LOW 20 MIN: CPT | Performed by: NURSE PRACTITIONER

## 2021-12-28 RX ORDER — DEXLANSOPRAZOLE 60 MG/1
TAKE 1 CAPSULE DAILY EVERY MORNING BEFORE BREAKFAST CAPSULE, DELAYED RELEASE ORAL
Qty: 90 | Refills: 3 | Status: ACTIVE | COMMUNITY

## 2021-12-28 RX ORDER — ALLOPURINOL 300 MG/1
TAKE 1 TABLET DAILY AS DIRECTED TABLET ORAL
Qty: 30 | Refills: 0 | Status: ACTIVE | COMMUNITY
Start: 2012-05-07

## 2021-12-28 RX ORDER — ASPIRIN 81 MG/1
1 TABLET TABLET ORAL ONCE A DAY
Status: ACTIVE | COMMUNITY

## 2021-12-28 RX ORDER — AZELASTINE HYDROCHLORIDE 0.5 MG/ML
1 DROP INTO AFFECTED EYE SOLUTION/ DROPS INTRAOCULAR ONCE A DAY
Status: ACTIVE | COMMUNITY

## 2021-12-28 RX ORDER — SUCRALFATE 1 G
1 TABLET ON AN EMPTY STOMACH TABLET ORAL TWICE A DAY
Status: ACTIVE | COMMUNITY

## 2021-12-28 NOTE — HPI-TODAY'S VISIT:
81-year-old with a history of gastroesophageal reflux disease, functional dyspepsia, chronic constipation.  She is being referred by Dr. Alford for weight loss.  Blood work on September 25, 2020 revealed a sodium 139 potassium 4.2 and creatinine of 3.1.  Her AST was 10, ALT 8 total bili 0.4 and alkaline phosphatase of 39.  Total protein was 6.4 and albumin was 3.4 she had a renal ultrasound on 9/10/2020 that revealed small bilateral echogenic kidneys.  There is a nonobstructing right renal stone 5 mm in a simple cyst in the left kidney 1 cm in size she had a CT scan of the abdomen pelvis without contrast on June 5, 2020 that revealed bibasilar atelectasis prior cholecystectomy a 7 mm left renal cyst and a right kidney calculus the pancreas was normal there is a tiny fat filled umbilical hernia containing nonobstructed loop of large bowel without obstruction.  CBC on 9/2/2020 revealed a WBC of 7.7, hemoglobin 10.8 and platelet count of 247,000 She had a colonoscopy on November 13, 2017 that revealed a few medium diverticuli of the left colon, scattered debris but no colon polyps.  Her last upper endoscopy was on July 9, 2012 that revealed a normal esophagus with small hiatal hernia there was some gastritis biopsies revealed no H. 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 July 20, 2021: She is doing a lot better.  Her appetite is much better and she is eating quite a bit more.  She tends to be a little bit more constipated despite taking the MiraLAX every day.  She's had no dysphagia, heartburn, abdominal pain, nausea or vomiting.  She moves her bowels about every other day.  Her weight has been stable since her last visit.  10/31/21: She is down about 4 or 5 times since her last visit. She tells me that her weight loss was somewhat intentional, but mostly uninentional. Her appetite is poor. She tries to eat 3 meals per day, but often times she only eats 2 meals per day. There is some early satiety. There is no nausea or vomiting. There is no abdominal distention or bloating. She has bowel movements every couple of days. There is some straining. She is taking MiraLAX once daily. She started taking it twice daily as of yesterday. There is no melena. There is no heartburn or dysphagia. She was recommended a CRP and ESR to evaluate for an inflammatory component of her weight loss. These were normal.  12/28/21: Her weight is stable at 151 pounds. There is no abdominal pain, but does admit some admit some constipation despite MiraLAX daily. She will use an enema as needed. She continues on twice daily Carafate. She tells me that her appetite is poor. She is not using Ensure supplements because she finds them distasteful.

## 2022-02-10 ENCOUNTER — OFFICE VISIT (OUTPATIENT)
Dept: URBAN - METROPOLITAN AREA CLINIC 113 | Facility: CLINIC | Age: 82
End: 2022-02-10

## 2022-03-07 ENCOUNTER — OFFICE VISIT (OUTPATIENT)
Dept: URBAN - METROPOLITAN AREA CLINIC 113 | Facility: CLINIC | Age: 82
End: 2022-03-07
Payer: MEDICARE

## 2022-03-07 VITALS
WEIGHT: 145 LBS | SYSTOLIC BLOOD PRESSURE: 129 MMHG | HEART RATE: 71 BPM | BODY MASS INDEX: 25.69 KG/M2 | HEIGHT: 63 IN | TEMPERATURE: 97.5 F | DIASTOLIC BLOOD PRESSURE: 68 MMHG

## 2022-03-07 DIAGNOSIS — K21.9 GERD WITHOUT ESOPHAGITIS: ICD-10-CM

## 2022-03-07 DIAGNOSIS — K59.04 CHRONIC IDIOPATHIC CONSTIPATION: ICD-10-CM

## 2022-03-07 DIAGNOSIS — R63.4 WEIGHT LOSS: ICD-10-CM

## 2022-03-07 DIAGNOSIS — D64.89 ANEMIA DUE TO OTHER CAUSE, NOT CLASSIFIED: ICD-10-CM

## 2022-03-07 PROCEDURE — 99213 OFFICE O/P EST LOW 20 MIN: CPT | Performed by: INTERNAL MEDICINE

## 2022-03-07 RX ORDER — ALLOPURINOL 300 MG/1
TAKE 1 TABLET DAILY AS DIRECTED TABLET ORAL
Qty: 30 | Refills: 0 | Status: ACTIVE | COMMUNITY
Start: 2012-05-07

## 2022-03-07 RX ORDER — AZELASTINE HYDROCHLORIDE 0.5 MG/ML
1 DROP INTO AFFECTED EYE SOLUTION/ DROPS INTRAOCULAR ONCE A DAY
Status: ACTIVE | COMMUNITY

## 2022-03-07 RX ORDER — DEXLANSOPRAZOLE 60 MG/1
TAKE 1 CAPSULE DAILY EVERY MORNING BEFORE BREAKFAST CAPSULE, DELAYED RELEASE ORAL
Qty: 90 | Refills: 3 | Status: ACTIVE | COMMUNITY

## 2022-03-07 RX ORDER — ASPIRIN 81 MG/1
1 TABLET TABLET ORAL ONCE A DAY
Status: ACTIVE | COMMUNITY

## 2022-03-07 RX ORDER — SUCRALFATE 1 G
1 TABLET ON AN EMPTY STOMACH TABLET ORAL TWICE A DAY
Status: ACTIVE | COMMUNITY

## 2022-03-07 NOTE — HPI-TODAY'S VISIT:
81-year-old with a history of gastroesophageal reflux disease, functional dyspepsia, chronic constipation.  She is being referred by Dr. Alford for weight loss.  Blood work on September 25, 2020 revealed a sodium 139 potassium 4.2 and creatinine of 3.1.  Her AST was 10, ALT 8 total bili 0.4 and alkaline phosphatase of 39.  Total protein was 6.4 and albumin was 3.4 she had a renal ultrasound on 9/10/2020 that revealed small bilateral echogenic kidneys.  There is a nonobstructing right renal stone 5 mm in a simple cyst in the left kidney 1 cm in size she had a CT scan of the abdomen pelvis without contrast on June 5, 2020 that revealed bibasilar atelectasis prior cholecystectomy a 7 mm left renal cyst and a right kidney calculus the pancreas was normal there is a tiny fat filled umbilical hernia containing nonobstructed loop of large bowel without obstruction.  CBC on 9/2/2020 revealed a WBC of 7.7, hemoglobin 10.8 and platelet count of 247,000 She had a colonoscopy on November 13, 2017 that revealed a few medium diverticuli of the left colon, scattered debris but no colon polyps.  Her last upper endoscopy was on July 9, 2012 that revealed a normal esophagus with small hiatal hernia there was some gastritis biopsies revealed no H. 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 July 20, 2021: She is doing a lot better.  Her appetite is much better and she is eating quite a bit more.  She tends to be a little bit more constipated despite taking the MiraLAX every day.  She's had no dysphagia, heartburn, abdominal pain, nausea or vomiting.  She moves her bowels about every other day.  Her weight has been stable since her last visit.  10/31/21: She is down about 4 or 5 pounds since her last visit. She tells me that her weight loss was somewhat intentional, but mostly uninentional.  CRP and ESR to evaluate for an inflammatory component of her weight loss. These were normal. She still does not have an appetite and has lost a little bit more weight.  She is trying to take some supplements.  She continues to have difficulty with constipation and takes MiraLAX twice a day and still uses an enema about every 3 days.  This been no dysphagia or heartburn.  She does have some lower abdominal discomfort every few days.  His below her umbilicus and across her lower abdomen.  When she does move her bowels she had a soft bowel movement.  There is no nausea or vomiting, fevers or chills.  She denies any bright red blood per rectum or any melena. Blood work on 2/9/2022 revealed a hemoglobin 11.4, WBC of 6.1 and platelet count of 256,000.  Sodium is 140 potassium 5.3 BUN 30 creatinine 2.28.  AST 11, ALT 6 alkaline phosphatase 61 total bili 0.2.  Albumin 4.1 total protein 7.3.

## 2022-03-22 ENCOUNTER — TELEPHONE ENCOUNTER (OUTPATIENT)
Dept: URBAN - METROPOLITAN AREA CLINIC 113 | Facility: CLINIC | Age: 82
End: 2022-03-22

## 2022-04-07 ENCOUNTER — DASHBOARD ENCOUNTERS (OUTPATIENT)
Age: 82
End: 2022-04-07

## 2022-04-07 ENCOUNTER — OFFICE VISIT (OUTPATIENT)
Dept: URBAN - METROPOLITAN AREA CLINIC 113 | Facility: CLINIC | Age: 82
End: 2022-04-07
Payer: MEDICARE

## 2022-04-07 VITALS
SYSTOLIC BLOOD PRESSURE: 142 MMHG | HEIGHT: 63 IN | BODY MASS INDEX: 25.52 KG/M2 | RESPIRATION RATE: 20 BRPM | HEART RATE: 73 BPM | DIASTOLIC BLOOD PRESSURE: 70 MMHG | WEIGHT: 144 LBS | TEMPERATURE: 97.8 F

## 2022-04-07 DIAGNOSIS — R63.4 WEIGHT LOSS: ICD-10-CM

## 2022-04-07 DIAGNOSIS — D64.89 ANEMIA DUE TO OTHER CAUSE, NOT CLASSIFIED: ICD-10-CM

## 2022-04-07 DIAGNOSIS — K21.9 GERD WITHOUT ESOPHAGITIS: ICD-10-CM

## 2022-04-07 DIAGNOSIS — K59.04 CHRONIC IDIOPATHIC CONSTIPATION: ICD-10-CM

## 2022-04-07 PROBLEM — 266435005: Status: ACTIVE | Noted: 2020-12-16

## 2022-04-07 PROBLEM — 82934008: Status: ACTIVE | Noted: 2021-07-20

## 2022-04-07 PROBLEM — 89362005: Status: ACTIVE | Noted: 2020-12-16

## 2022-04-07 PROBLEM — 271737000: Status: ACTIVE | Noted: 2022-03-07

## 2022-04-07 PROCEDURE — 99213 OFFICE O/P EST LOW 20 MIN: CPT | Performed by: NURSE PRACTITIONER

## 2022-04-07 RX ORDER — DEXLANSOPRAZOLE 60 MG/1
TAKE 1 CAPSULE DAILY EVERY MORNING BEFORE BREAKFAST CAPSULE, DELAYED RELEASE ORAL
Qty: 90 | Refills: 3 | Status: ACTIVE | COMMUNITY

## 2022-04-07 RX ORDER — ALLOPURINOL 300 MG/1
TAKE 1 TABLET DAILY AS DIRECTED TABLET ORAL
Qty: 30 | Refills: 0 | Status: ACTIVE | COMMUNITY
Start: 2012-05-07

## 2022-04-07 RX ORDER — SUCRALFATE 1 G
1 TABLET ON AN EMPTY STOMACH TABLET ORAL TWICE A DAY
Status: ACTIVE | COMMUNITY

## 2022-04-07 RX ORDER — ASPIRIN 81 MG/1
1 TABLET TABLET ORAL ONCE A DAY
Status: ACTIVE | COMMUNITY

## 2022-04-07 RX ORDER — AZELASTINE HYDROCHLORIDE 0.5 MG/ML
1 DROP INTO AFFECTED EYE SOLUTION/ DROPS INTRAOCULAR ONCE A DAY
Status: ACTIVE | COMMUNITY

## 2022-04-07 NOTE — HPI-TODAY'S VISIT:
81-year-old female with history of GERD, chronic constipation, presenting for 4-week follow-up regarding anemia and weight loss. Regarding her weight loss evaluation, this has been overall unrevealing.  Blood work has been unremarkable except for creatinine of 3.1 hemoglobin of 10.8.  She had a CT scan in June 2020 without contrast which was unremarkable.  She has had a normal appearing esophagus with a small hiatal hernia, mild erythema of the stomach and biopsies showing chemical gastropathy without H. pylori.  Duodenum was normal and biopsies were normal.  Inflammatory markers have been normal.  She was recommended to continue Ensure supplementation.  She was to continue MiraLAX for constipation.  Since she was not having any trouble with heartburn, she was recommended to stop Carafate.  Regarding her anemia, she had recently had an SPEP showing an empty component and free IgG kappa proteins.  She was recommended hematology evaluation. She has not yet seen Dr. Peacock with hematology, and is scheduled to see her on 4/21/2022.  Her weight is down one pound from last visit. She tells me that her appetite is poor. She is eating three small meals per day. She does not have an appetite. Her stomach feels full. She is having bowel movements every other day. She has been using enemas every other day. She is taking MiraLAX one daily. She has not started Senokot as previously recommended.